# Patient Record
Sex: FEMALE | Race: WHITE | NOT HISPANIC OR LATINO | Employment: UNEMPLOYED | URBAN - METROPOLITAN AREA
[De-identification: names, ages, dates, MRNs, and addresses within clinical notes are randomized per-mention and may not be internally consistent; named-entity substitution may affect disease eponyms.]

---

## 2023-11-17 ENCOUNTER — OFFICE VISIT (OUTPATIENT)
Dept: DERMATOLOGY | Facility: CLINIC | Age: 48
End: 2023-11-17

## 2023-11-17 DIAGNOSIS — L98.8 WRINKLES: Primary | ICD-10-CM

## 2023-11-17 PROCEDURE — PBTXA ADMINISTRATION FEE COSMETIC: Performed by: DERMATOLOGY

## 2023-11-17 PROCEDURE — BOTOX BOTOX 1 UNIT: Performed by: DERMATOLOGY

## 2023-11-17 ASSESSMENT — DERMATOLOGY PATIENT ASSESSMENT
DO YOU USE SUNSCREEN: OCCASIONALLY
DO YOU HAVE IRREGULAR MENSTRUAL CYCLES: NO
DO YOU USE A TANNING BED: NO
ARE YOU ON BIRTH CONTROL: NO
HAVE YOU HAD OR DO YOU HAVE A STAPH INFECTION: NO
HAVE YOU HAD OR DO YOU HAVE VASCULAR DISEASE: NO
ARE YOU AN ORGAN TRANSPLANT RECIPIENT: NO
ARE YOU TRYING TO GET PREGNANT: NO
DO YOU HAVE ANY NEW OR CHANGING LESIONS: NO

## 2023-11-17 ASSESSMENT — DERMATOLOGY QUALITY OF LIFE (QOL) ASSESSMENT
RATE HOW BOTHERED YOU ARE BY EFFECTS OF YOUR SKIN PROBLEMS ON YOUR ACTIVITIES (EG, GOING OUT, ACCOMPLISHING WHAT YOU WANT, WORK ACTIVITIES OR YOUR RELATIONSHIPS WITH OTHERS): 0 - NEVER BOTHERED
WHAT SINGLE SKIN CONDITION LISTED BELOW IS THE PATIENT ANSWERING THE QUALITY-OF-LIFE ASSESSMENT QUESTIONS ABOUT: NONE OF THE ABOVE
RATE HOW EMOTIONALLY BOTHERED YOU ARE BY YOUR SKIN PROBLEM (FOR EXAMPLE, WORRY, EMBARRASSMENT, FRUSTRATION): 0 - NEVER BOTHERED
ARE THERE EXCLUSIONS OR EXCEPTIONS FOR THE QUALITY OF LIFE ASSESSMENT: NO
DATE THE QUALITY-OF-LIFE ASSESSMENT WAS COMPLETED: 66795
RATE HOW BOTHERED YOU ARE BY SYMPTOMS OF YOUR SKIN PROBLEM (EG, ITCHING, STINGING BURNING, HURTING OR SKIN IRRITATION): 0 - NEVER BOTHERED

## 2023-11-17 ASSESSMENT — ITCH NUMERIC RATING SCALE: HOW SEVERE IS YOUR ITCHING?: 0

## 2023-11-17 ASSESSMENT — PATIENT GLOBAL ASSESSMENT (PGA): PATIENT GLOBAL ASSESSMENT: PATIENT GLOBAL ASSESSMENT:  1 - CLEAR

## 2023-11-17 NOTE — PROGRESS NOTES
Subjective     Katiuska Dyer is a 48 y.o. female who presents for the following: Botulinum Toxin Injection (LV - 8/29/23 ).     Review of Systems:  No other skin or systemic complaints other than what is documented elsewhere in the note.    The following portions of the chart were reviewed this encounter and updated as appropriate:          Skin Cancer History  No skin cancer on file.      Specialty Problems    None       Objective   Well appearing patient in no apparent distress; mood and affect are within normal limits.    A focused skin examination was performed of the face. All findings within normal limits unless otherwise noted below.    Assessment/Plan   1. Wrinkles  Glabella  Dynamic and static rhytids    Botox is a neurotoxin which prevents muscles from jerzy that can help soften and smooth fine lines/wrinkles.  Risks and benefits were discussed bruising and swelling, lid droop, dropping of eyebrow, asymmetrical smile. For continued improvement ongoing treatment with Botox will soften lines  Botox typically begins to work 3 days after injection, full effect by 14 days. Typically lasts about 3 months. Continued treatments will continue to help lines to soften over time. However, lines may not completely disappear.  After treatment do not lie flat for 4 hours and do not exercise for 24 hours after the procedure.    Chemodenervation - Glabella    Date/Time: 11/17/2023 12:08 PM    Performed by: Ekta Taylor DO  Authorized by: Ekta Taylor DO      Cosmetic:  yes    Consent:      Consent obtained:  Written     Consent given by:  Patient     Risks discussed:  Poor cosmetic result     Alternatives discussed:  No treatment    Universal protocol:      Relevant documents present and verified:  Yes       Site/side verified:  Yes       Immediately prior to procedure a time out was called:  Yes       Patient identity confirmed:  Verbally with patient    Pre-procedure details:   Prep type:  Isopropyl  alcohol    Procedure details:      Diluted by:  Preservative free saline     Toxin (Brand):  OnaBoNT-A (Botox)     Concentration (u/mL):  2U/0.1mL    Total units injected:  34    Post-procedure details:      Patient tolerance of procedure:  Tolerated well, no immediate complications    Comments: Forehead - 4; Glabella - 12; Perioral -4; Periorbital - 13 (6.5 each side)    onabotulinumtoxinA (cosmetic) (Botox) injection 34 Units - Glabella      Related Procedures  Follow Up In Dermatology - Established Patient

## 2024-02-13 ENCOUNTER — OFFICE VISIT (OUTPATIENT)
Dept: DERMATOLOGY | Facility: CLINIC | Age: 49
End: 2024-02-13

## 2024-02-13 DIAGNOSIS — L98.8 WRINKLES: ICD-10-CM

## 2024-02-13 PROCEDURE — PBTXA ADMINISTRATION FEE COSMETIC: Performed by: DERMATOLOGY

## 2024-02-13 PROCEDURE — 1036F TOBACCO NON-USER: CPT | Performed by: DERMATOLOGY

## 2024-02-13 PROCEDURE — BOTOX BOTOX 1 UNIT: Performed by: DERMATOLOGY

## 2024-02-13 ASSESSMENT — DERMATOLOGY QUALITY OF LIFE (QOL) ASSESSMENT
RATE HOW EMOTIONALLY BOTHERED YOU ARE BY YOUR SKIN PROBLEM (FOR EXAMPLE, WORRY, EMBARRASSMENT, FRUSTRATION): 0 - NEVER BOTHERED
WHAT SINGLE SKIN CONDITION LISTED BELOW IS THE PATIENT ANSWERING THE QUALITY-OF-LIFE ASSESSMENT QUESTIONS ABOUT: NONE OF THE ABOVE
ARE THERE EXCLUSIONS OR EXCEPTIONS FOR THE QUALITY OF LIFE ASSESSMENT: NO
RATE HOW BOTHERED YOU ARE BY EFFECTS OF YOUR SKIN PROBLEMS ON YOUR ACTIVITIES (EG, GOING OUT, ACCOMPLISHING WHAT YOU WANT, WORK ACTIVITIES OR YOUR RELATIONSHIPS WITH OTHERS): 0 - NEVER BOTHERED
RATE HOW BOTHERED YOU ARE BY SYMPTOMS OF YOUR SKIN PROBLEM (EG, ITCHING, STINGING BURNING, HURTING OR SKIN IRRITATION): 0 - NEVER BOTHERED

## 2024-02-13 ASSESSMENT — DERMATOLOGY PATIENT ASSESSMENT
DO YOU HAVE ANY NEW OR CHANGING LESIONS: NO
DO YOU USE A TANNING BED: NO
ARE YOU AN ORGAN TRANSPLANT RECIPIENT: NO
DO YOU USE SUNSCREEN: DAILY
FOR PATIENTS COMING IN FOR A FOLLOW-UP VISIT - HAVE THERE BEEN ANY CHANGES IN YOUR HEALTH SINCE YOUR LAST VISIT: NO

## 2024-02-13 ASSESSMENT — PATIENT GLOBAL ASSESSMENT (PGA): PATIENT GLOBAL ASSESSMENT: PATIENT GLOBAL ASSESSMENT:  2 - MILD

## 2024-02-13 ASSESSMENT — ITCH NUMERIC RATING SCALE: HOW SEVERE IS YOUR ITCHING?: 0

## 2024-02-13 NOTE — PROGRESS NOTES
Subjective     Katiuska Dyer is a 48 y.o. female who presents for the following: Cosmetic (Pt here for Botox. LV-11/2023 34 units total injected=Forehead-4, Glabella-12, Perioral-4, Periorbital-13(6.5 each side). Pt thinks she may need more in forehead area. Not have perioral area injected. ).     Review of Systems:  No other skin or systemic complaints other than what is documented elsewhere in the note.    The following portions of the chart were reviewed this encounter and updated as appropriate:          Skin Cancer History  No skin cancer on file.      Specialty Problems    None       Objective   Well appearing patient in no apparent distress; mood and affect are within normal limits.    A focused skin examination was performed of the face. All findings within normal limits unless otherwise noted below.    Assessment/Plan   1. Wrinkles  Glabella  Dynamic and static rhytids    Botox is a neurotoxin which prevents muscles from jerzy that can help soften and smooth fine lines/wrinkles.  Risks and benefits were discussed bruising and swelling, lid droop, dropping of eyebrow, asymmetrical smile. For continued improvement ongoing treatment with Botox will soften lines  Botox typically begins to work 3 days after injection, full effect by 14 days. Typically lasts about 3 months. Continued treatments will continue to help lines to soften over time. However, lines may not completely disappear.  After treatment do not lie flat for 4 hours and do not exercise for 24 hours after the procedure.    Chemodenervation - Glabella    Date/Time: 2/13/2024 11:59 AM    Performed by: Ekta Taylor DO  Authorized by: Ekta Taylor DO      Cosmetic:  yes    Consent:      Consent obtained:  Written     Consent given by:  Patient     Risks discussed:  Poor cosmetic result     Alternatives discussed:  No treatment    Universal protocol:      Relevant documents present and verified:  Yes       Site/side verified:  Yes        Immediately prior to procedure a time out was called:  Yes      Pre-procedure details:   Prep type:  Isopropyl alcohol    Procedure details:      Diluted by:  Preservative free saline     Toxin (Brand):  OnaBoNT-A (Botox)     Concentration (u/mL):  2U/0.1mL    Total units injected:  30    Post-procedure details:      Patient tolerance of procedure:  Tolerated well, no immediate complications    Comments: Forehead - 5  Periorbital - 13 (6.5 per side)  Glabella - 12    botulinum toxin Type A (Cosm) (Botox) injection 30 Units - Glabella      Related Procedures  Follow Up In Dermatology - Established Patient

## 2024-05-07 ENCOUNTER — OFFICE VISIT (OUTPATIENT)
Dept: DERMATOLOGY | Facility: CLINIC | Age: 49
End: 2024-05-07

## 2024-05-07 ENCOUNTER — OFFICE VISIT (OUTPATIENT)
Dept: DERMATOLOGY | Facility: CLINIC | Age: 49
End: 2024-05-07
Payer: COMMERCIAL

## 2024-05-07 DIAGNOSIS — L57.8 PHOTOAGING OF SKIN: ICD-10-CM

## 2024-05-07 DIAGNOSIS — D22.71 MELANOCYTIC NEVI OF RIGHT LOWER LIMB, INCLUDING HIP: ICD-10-CM

## 2024-05-07 DIAGNOSIS — L81.4 LENTIGO: ICD-10-CM

## 2024-05-07 DIAGNOSIS — Z41.9 SURGERY, ELECTIVE: ICD-10-CM

## 2024-05-07 DIAGNOSIS — D22.5 MELANOCYTIC NEVI OF TRUNK: ICD-10-CM

## 2024-05-07 DIAGNOSIS — D22.72 MELANOCYTIC NEVI OF LEFT LOWER EXTREMITY OR HIP: ICD-10-CM

## 2024-05-07 DIAGNOSIS — D18.01 HEMANGIOMA OF SKIN: ICD-10-CM

## 2024-05-07 DIAGNOSIS — Z12.83 ENCOUNTER FOR SCREENING FOR MALIGNANT NEOPLASM OF SKIN: ICD-10-CM

## 2024-05-07 DIAGNOSIS — L98.8 WRINKLES: Primary | ICD-10-CM

## 2024-05-07 DIAGNOSIS — D22.60 MELANOCYTIC NEVI OF UNSPECIFIED UPPER LIMB, INCLUDING SHOULDER: ICD-10-CM

## 2024-05-07 DIAGNOSIS — Z85.828 PERSONAL HISTORY OF OTHER MALIGNANT NEOPLASM OF SKIN: Primary | ICD-10-CM

## 2024-05-07 PROCEDURE — BOTOX BOTOX 1 UNIT: Performed by: DERMATOLOGY

## 2024-05-07 PROCEDURE — PBTXA ADMINISTRATION FEE COSMETIC: Performed by: DERMATOLOGY

## 2024-05-07 PROCEDURE — 1036F TOBACCO NON-USER: CPT | Performed by: DERMATOLOGY

## 2024-05-07 PROCEDURE — 99213 OFFICE O/P EST LOW 20 MIN: CPT | Performed by: DERMATOLOGY

## 2024-05-07 ASSESSMENT — DERMATOLOGY QUALITY OF LIFE (QOL) ASSESSMENT
ARE THERE EXCLUSIONS OR EXCEPTIONS FOR THE QUALITY OF LIFE ASSESSMENT: NO
RATE HOW EMOTIONALLY BOTHERED YOU ARE BY YOUR SKIN PROBLEM (FOR EXAMPLE, WORRY, EMBARRASSMENT, FRUSTRATION): 0 - NEVER BOTHERED
WHAT SINGLE SKIN CONDITION LISTED BELOW IS THE PATIENT ANSWERING THE QUALITY-OF-LIFE ASSESSMENT QUESTIONS ABOUT: NONE OF THE ABOVE
RATE HOW BOTHERED YOU ARE BY EFFECTS OF YOUR SKIN PROBLEMS ON YOUR ACTIVITIES (EG, GOING OUT, ACCOMPLISHING WHAT YOU WANT, WORK ACTIVITIES OR YOUR RELATIONSHIPS WITH OTHERS): 0 - NEVER BOTHERED
RATE HOW EMOTIONALLY BOTHERED YOU ARE BY YOUR SKIN PROBLEM (FOR EXAMPLE, WORRY, EMBARRASSMENT, FRUSTRATION): 0 - NEVER BOTHERED
RATE HOW BOTHERED YOU ARE BY SYMPTOMS OF YOUR SKIN PROBLEM (EG, ITCHING, STINGING BURNING, HURTING OR SKIN IRRITATION): 0 - NEVER BOTHERED
ARE THERE EXCLUSIONS OR EXCEPTIONS FOR THE QUALITY OF LIFE ASSESSMENT: NO
RATE HOW BOTHERED YOU ARE BY SYMPTOMS OF YOUR SKIN PROBLEM (EG, ITCHING, STINGING BURNING, HURTING OR SKIN IRRITATION): 0 - NEVER BOTHERED
RATE HOW BOTHERED YOU ARE BY EFFECTS OF YOUR SKIN PROBLEMS ON YOUR ACTIVITIES (EG, GOING OUT, ACCOMPLISHING WHAT YOU WANT, WORK ACTIVITIES OR YOUR RELATIONSHIPS WITH OTHERS): 0 - NEVER BOTHERED
WHAT SINGLE SKIN CONDITION LISTED BELOW IS THE PATIENT ANSWERING THE QUALITY-OF-LIFE ASSESSMENT QUESTIONS ABOUT: NONE OF THE ABOVE

## 2024-05-07 ASSESSMENT — DERMATOLOGY PATIENT ASSESSMENT
ARE YOU AN ORGAN TRANSPLANT RECIPIENT: NO
DO YOU USE SUNSCREEN: DAILY
ARE YOU AN ORGAN TRANSPLANT RECIPIENT: NO
FOR PATIENTS COMING IN FOR A FOLLOW-UP VISIT - HAVE THERE BEEN ANY CHANGES IN YOUR HEALTH SINCE YOUR LAST VISIT: NO
DO YOU USE SUNSCREEN: DAILY
DO YOU HAVE ANY NEW OR CHANGING LESIONS: NO
WHERE ARE THESE NEW OR CHANGING LESIONS LOCATED: LEGS
DO YOU USE A TANNING BED: NO
DO YOU HAVE ANY NEW OR CHANGING LESIONS: NO
ARE YOU TRYING TO GET PREGNANT: NO
FOR PATIENTS COMING IN FOR A FOLLOW-UP VISIT - HAVE THERE BEEN ANY CHANGES IN YOUR HEALTH SINCE YOUR LAST VISIT: NO
DO YOU USE A TANNING BED: NO

## 2024-05-07 ASSESSMENT — ITCH NUMERIC RATING SCALE
HOW SEVERE IS YOUR ITCHING?: 0
HOW SEVERE IS YOUR ITCHING?: 0

## 2024-05-07 ASSESSMENT — PATIENT GLOBAL ASSESSMENT (PGA)
PATIENT GLOBAL ASSESSMENT: PATIENT GLOBAL ASSESSMENT:  2 - MILD
PATIENT GLOBAL ASSESSMENT: PATIENT GLOBAL ASSESSMENT:  1 - CLEAR

## 2024-05-07 NOTE — PROGRESS NOTES
Subjective     Katiuska Dyer is a 48 y.o. female who presents for the following: Cosmetic (Pt here for Botox. LV 30 units total injected(glabella-12, forehead-5, periorbital-6.5 per side). Pt was happy with results. Would like to not inject forehead this time.).     Review of Systems:  No other skin or systemic complaints other than what is documented elsewhere in the note.    The following portions of the chart were reviewed this encounter and updated as appropriate:          Skin Cancer History  No skin cancer on file.      Specialty Problems    None       Objective   Well appearing patient in no apparent distress; mood and affect are within normal limits.    A focused skin examination was performed. All findings within normal limits unless otherwise noted below.    Assessment/Plan   1. Wrinkles  Glabella  Dynamic and static rhytids    Botox is a neurotoxin which prevents muscles from jerzy that can help soften and smooth fine lines/wrinkles.  Risks and benefits were discussed bruising and swelling, lid droop, dropping of eyebrow, asymmetrical smile. For continued improvement ongoing treatment with Botox will soften lines  Botox typically begins to work 3 days after injection, full effect by 14 days. Typically lasts about 3 months. Continued treatments will continue to help lines to soften over time. However, lines may not completely disappear.  After treatment do not lie flat for 4 hours and do not exercise for 24 hours after the procedure.    Chemodenervation - Glabella    Date/Time: 5/7/2024 8:50 AM    Performed by: Ekta Taylor DO  Authorized by: Ekta Taylor DO      Cosmetic:  yesnot medical botulinum toxin injection    Consent:      Consent obtained:  Written     Consent given by:  Patient     Risks discussed:  Poor cosmetic result     Alternatives discussed:  No treatment    Universal protocol:      Relevant documents present and verified:  Yes       Site/side verified:  Yes       Patient  identity confirmed:  Verbally with patient    Pre-procedure details:   Prep type:  Isopropyl alcohol    Procedure details:      Diluted by:  Preservative free saline     Toxin (Brand):  OnaBoNT-A (Botox)     Concentration (u/mL):  2U/01mL      Post-procedure details:      Patient tolerance of procedure:  Tolerated well, no immediate complications    Comments: Units injected: 27 units  15 glabella  12 periorbital      botulinum toxin Type A (Cosm) (Botox) injection 27 Units - Glabella      We discussed:  Fraxel - Fall - chest for browns x 1-2 treatments  Volbella - eyes and fine lines in periorbital  0.5cc syringe for all - aware to call prior as we will have to order product.

## 2024-05-07 NOTE — PROGRESS NOTES
Subjective     Katiuska Dyer is a 48 y.o. female who presents for the following: Skin Check (Pt here for yearly FBSE. Hx of AMH. Pt has lesions on legs she wants to be sure are okay.).     Review of Systems:  No other skin or systemic complaints other than what is documented elsewhere in the note.    The following portions of the chart were reviewed this encounter and updated as appropriate:         Skin Cancer History  No skin cancer on file.      Specialty Problems    None       Objective   Well appearing patient in no apparent distress; mood and affect are within normal limits.    A full examination was performed including scalp, head, eyes, ears, nose, lips, neck, chest, axillae, abdomen, back, buttocks, bilateral upper extremities, bilateral lower extremities, hands, feet, fingers, toes, fingernails, and toenails. All findings within normal limits unless otherwise noted below.    Assessment/Plan   1. Personal history of other malignant neoplasm of skin  Right Abdomen (side) - Upper  Well healed scar at site of prior treatment without evidence of recurrence.    There is no evidence of recurrence on clinical examination today, reassurance was provided to the patient. The importance of sun protection was reviewed with the patient including the use of a broad spectrum sunscreen that protects against both UVA/UVB rays, with ingredients such as Zinc oxide or titanium dioxide, wearing sun protective clothing and sun avoidance. Warning signs of non-melanoma skin cancer were reviewed. ABCDEs of melanoma reviewed. Patient to f/u should they notice any new or changing pre-existing skin lesion    2. Encounter for screening for malignant neoplasm of skin  No suspicious lesions noted on examination today    The risk of chronic, cumulative sun damage and risk of development of skin cancer was reviewed today.   The importance of sun protection was reviewed: including the use of a broad spectrum sunscreen that protects against  both UVA/UVB rays, with ingredients such as Zinc oxide or titanium dioxide, wearing sun protective clothing and sun avoidance. We reviewed the warning signs of non-melanoma skin cancer and ABCDEs of melanoma  Please follow up should you notice any new or changing pre-existing skin lesion.    3. Photoaging of skin  Mottled pigmentation with telangiectasias and brown reticular macules in sun exposed areas of the body.    The risk of chronic, cumulative sun damage and risk of development of skin cancer was reviewed today.   The importance of sun protection was reviewed: including the use of a broad spectrum sunscreen that protects against both UVA/UVB rays, with ingredients such as Zinc oxide or titanium dioxide, wearing sun protective clothing and sun avoidance. We reviewed the warning signs of non-melanoma skin cancer and ABCDEs of melanoma  Please follow up should you notice any new or changing pre-existing skin lesion.    4. Melanocytic nevi of unspecified upper limb, including shoulder (2)  Left Arm, Right Arm  Scattered, uniform and benign-appearing, regular brown melanocytic papules and macules.    Clinically benign appearing nevi, no treatment is necessary.  The importance of sun protection was reviewed: including the use of a broad spectrum sunscreen that protects against both UVA/UVB rays, with ingredients such as Zinc oxide or titanium dioxide, wearing sun protective clothing and sun avoidance.   ABCDEs of melanoma reviewed.  Please follow up should you notice any new or changing pre-existing skin lesion.    5. Melanocytic nevi of left lower extremity or hip  Left Leg  Scattered, uniform and benign-appearing, regular brown melanocytic papules and macules.    Clinically benign appearing nevi, no treatment is necessary.  The importance of sun protection was reviewed: including the use of a broad spectrum sunscreen that protects against both UVA/UVB rays, with ingredients such as Zinc oxide or titanium dioxide,  wearing sun protective clothing and sun avoidance.   ABCDEs of melanoma reviewed.  Please follow up should you notice any new or changing pre-existing skin lesion.    6. Melanocytic nevi of right lower limb, including hip  Right Leg  Scattered, uniform and benign-appearing, regular brown melanocytic papules and macules.    Clinically benign appearing nevi, no treatment is necessary.  The importance of sun protection was reviewed: including the use of a broad spectrum sunscreen that protects against both UVA/UVB rays, with ingredients such as Zinc oxide or titanium dioxide, wearing sun protective clothing and sun avoidance.   ABCDEs of melanoma reviewed.  Please follow up should you notice any new or changing pre-existing skin lesion.    7. Melanocytic nevi of trunk (3)  Abdomen (Lower Torso, Anterior), Chest (Upper Torso, Anterior), Torso - Posterior (Back)  Tan-brown symmetric macules and papules    Clinically benign appearing nevi, no treatment is necessary.  The importance of sun protection was reviewed: including the use of a broad spectrum sunscreen that protects against both UVA/UVB rays, with ingredients such as Zinc oxide or titanium dioxide, wearing sun protective clothing and sun avoidance.   ABCDEs of melanoma reviewed.  Please follow up should you notice any new or changing pre-existing skin lesion.    8. Lentigo  Scattered tan macules in sun-exposed areas.    These are benign skin lesions due to sun exposure. They will darken in response to sun exposure. They should be monitored for change in size, shape or color.  These lesions can be treated cosmetically with topical creams, liquid nitrogen and a variety of lasers.    9. Hemangioma of skin  Cherry red papules    The benign nature of these skin lesions were reviewed, no treatment is necessary.   Please follow up for any new or pre-existing lesion that is changing in size, shape, color, becomes painful, tender, itches or bleed.

## 2024-07-23 ENCOUNTER — APPOINTMENT (OUTPATIENT)
Dept: DERMATOLOGY | Facility: CLINIC | Age: 49
End: 2024-07-23
Payer: COMMERCIAL

## 2024-07-23 DIAGNOSIS — Z41.9 ELECTIVE SURGERY FOR PURPOSES OTHER THAN TREATING HEALTH CONDITIONS: ICD-10-CM

## 2024-07-23 DIAGNOSIS — L98.8 WRINKLES: Primary | ICD-10-CM

## 2024-07-23 PROCEDURE — BOTOX BOTOX 1 UNIT: Performed by: DERMATOLOGY

## 2024-07-23 PROCEDURE — DVOBE: Performed by: DERMATOLOGY

## 2024-07-23 PROCEDURE — 1036F TOBACCO NON-USER: CPT | Performed by: DERMATOLOGY

## 2024-07-23 PROCEDURE — PBTXA ADMINISTRATION FEE COSMETIC: Performed by: DERMATOLOGY

## 2024-07-23 ASSESSMENT — ITCH NUMERIC RATING SCALE: HOW SEVERE IS YOUR ITCHING?: 0

## 2024-07-23 ASSESSMENT — DERMATOLOGY PATIENT ASSESSMENT
DO YOU HAVE ANY NEW OR CHANGING LESIONS: NO
DO YOU USE SUNSCREEN: DAILY
ARE YOU AN ORGAN TRANSPLANT RECIPIENT: NO
DO YOU USE A TANNING BED: NO
FOR PATIENTS COMING IN FOR A FOLLOW-UP VISIT - HAVE THERE BEEN ANY CHANGES IN YOUR HEALTH SINCE YOUR LAST VISIT: NO
ARE YOU TRYING TO GET PREGNANT: NO
ARE YOU ON BIRTH CONTROL: NO

## 2024-07-23 ASSESSMENT — DERMATOLOGY QUALITY OF LIFE (QOL) ASSESSMENT
RATE HOW BOTHERED YOU ARE BY SYMPTOMS OF YOUR SKIN PROBLEM (EG, ITCHING, STINGING BURNING, HURTING OR SKIN IRRITATION): 0 - NEVER BOTHERED
WHAT SINGLE SKIN CONDITION LISTED BELOW IS THE PATIENT ANSWERING THE QUALITY-OF-LIFE ASSESSMENT QUESTIONS ABOUT: NONE OF THE ABOVE
ARE THERE EXCLUSIONS OR EXCEPTIONS FOR THE QUALITY OF LIFE ASSESSMENT: NO
RATE HOW EMOTIONALLY BOTHERED YOU ARE BY YOUR SKIN PROBLEM (FOR EXAMPLE, WORRY, EMBARRASSMENT, FRUSTRATION): 0 - NEVER BOTHERED
RATE HOW BOTHERED YOU ARE BY EFFECTS OF YOUR SKIN PROBLEMS ON YOUR ACTIVITIES (EG, GOING OUT, ACCOMPLISHING WHAT YOU WANT, WORK ACTIVITIES OR YOUR RELATIONSHIPS WITH OTHERS): 0 - NEVER BOTHERED

## 2024-07-23 ASSESSMENT — PATIENT GLOBAL ASSESSMENT (PGA): PATIENT GLOBAL ASSESSMENT: PATIENT GLOBAL ASSESSMENT:  1 - CLEAR

## 2024-07-23 NOTE — PROGRESS NOTES
Subjective     Katiuska Dyer is a 48 y.o. female who presents for the following: Cosmetic (Pt here today for Botox. LV 5/7/24- 27 units total(15 glabella, 12 periorbital). Pt here for Filler. LV discussed Volbella- eyes and fine lines in periorbital 0.5cc syringe for all./).     Review of Systems:  No other skin or systemic complaints other than what is documented elsewhere in the note.    The following portions of the chart were reviewed this encounter and updated as appropriate:          Skin Cancer History  No skin cancer on file.      Specialty Problems    None       Objective   Well appearing patient in no apparent distress; mood and affect are within normal limits.    A focused skin examination was performed of the face. All findings within normal limits unless otherwise noted below.    Assessment/Plan   1. Wrinkles (2)  Left Temple, Right Zygomatic Area  Dynamic and static rhytids    Botox is a neurotoxin which prevents muscles from jerzy that can help soften and smooth fine lines/wrinkles.  Risks and benefits were discussed bruising and swelling, lid droop, dropping of eyebrow, asymmetrical smile. For continued improvement ongoing treatment with Botox will soften lines  Botox typically begins to work 3 days after injection, full effect by 14 days. Typically lasts about 3 months. Continued treatments will continue to help lines to soften over time. However, lines may not completely disappear.  After treatment do not lie flat for 4 hours and do not exercise for 24 hours after the procedure.    Chemodenervation - Left Temple, Right Zygomatic Area    Date/Time: 7/23/2024 1:39 PM    Performed by: Ekta Taylor DO  Authorized by: Ekta Taylor DO      Cosmetic:  yesnot medical botulinum toxin injection    Consent:      Consent obtained:  Written     Consent given by:  Patient     Risks discussed:  Poor cosmetic result     Alternatives discussed:  No treatment    Universal protocol:      Relevant  documents present and verified:  Yes       Site/side verified:  Yes       Patient identity confirmed:  Verbally with patient    Pre-procedure details:   Prep type:  Isopropyl alcohol    Procedure details:      Diluted by:  Preservative free saline     Toxin (Brand):  OnaBoNT-A (Botox)     Concentration (u/mL):  2U/0.1mL      Post-procedure details:      Patient tolerance of procedure:  Tolerated well, no immediate complications    Comments: Units injected:  Periorbital - 14 (7/side)    botulinum toxin Type A (Cosm) (Botox) injection 14 Units - Left Temple, Right Zygomatic Area        Cosmetic Filler Procedure Note  Diagnosis: rhytides, scar on nasal tip  Location: see diagram and fine lines on bilateral cheeks, scar on nasal tip  Filler:  Juvederm volbella - 0.55cc  Informed consent: Discussed risks (infection, pain, bleeding, bruising, swelling, allergic reaction, lumpiness of skin, waviness of skin, discoloration of skin, undesirable cosmetic result, need for additional treatment, herpes outbreak, scarring, blindness, and death) and benefits of the procedure, as well as the alternatives.  Informed consent was obtained.  Preparation: The area was cleansed with alcohol.  Anesthesia:  none  Procedure Details:  Filler was injected into the appropriate areas using a standard technique.  Pressure was applied for hemostasis.  Ice was offered for swelling.  Lot Number:  [id number]  Expiration:  8/2/24  Total Volume Injected:  0.5  Plan:  She was instructed to use Tylenol for any pain.  Return to clinic for any nodules or lumpiness that appear in the first 48 hours.  She will call for any problems.  Return to clinic for additional dosing as needed.    Tolerated well by patient.      Plan for Fraxel in the Fall for browns.    Follow up in 3-4 months for botox.

## 2024-08-26 ENCOUNTER — TELEPHONE (OUTPATIENT)
Dept: DERMATOLOGY | Facility: CLINIC | Age: 49
End: 2024-08-26
Payer: COMMERCIAL

## 2024-08-26 NOTE — TELEPHONE ENCOUNTER
Pt LM wanting to schedule an appt in Oct for Botox. Also stated she is still receiving a bill for $583 for previous Volbella treatment.   Returned call and scheduled pts Botox appt 10/15/24 10:20a. Informed pt I would reach back out to management and billing r/t bill. Instructed pt to NOT pay balance as she paid in full at time of service.

## 2024-10-15 ENCOUNTER — APPOINTMENT (OUTPATIENT)
Dept: DERMATOLOGY | Facility: CLINIC | Age: 49
End: 2024-10-15
Payer: COMMERCIAL

## 2024-10-15 DIAGNOSIS — Z41.9 ELECTIVE SURGERY FOR PURPOSES OTHER THAN TREATING HEALTH CONDITIONS: Primary | ICD-10-CM

## 2024-10-15 PROCEDURE — BOTOX BOTOX 1 UNIT: Performed by: DERMATOLOGY

## 2024-10-15 PROCEDURE — PBTXA ADMINISTRATION FEE COSMETIC: Performed by: DERMATOLOGY

## 2024-10-15 NOTE — PROGRESS NOTES
Subjective     Katiuska Dyer is a 49 y.o. female who presents for the following: Botulinum Toxin Injection (Patient would like botox injections around eyes and a little on forehead.).     Review of Systems:  No other skin or systemic complaints other than what is documented elsewhere in the note.    The following portions of the chart were reviewed this encounter and updated as appropriate:          Skin Cancer History  No skin cancer on file.      Specialty Problems    None       Objective   Well appearing patient in no apparent distress; mood and affect are within normal limits.    A focused skin examination was performed of the face. All findings within normal limits unless otherwise noted below.    Assessment/Plan   1. Elective surgery for purposes other than treating health conditions (4)  Left Forehead, Left Temple, Right Forehead, Right Zygomatic Area  Dynamic and static rhytids    Botox is a neurotoxin which prevents muscles from jerzy that can help soften and smooth fine lines/wrinkles.  Risks and benefits were discussed bruising and swelling, lid droop, dropping of eyebrow, asymmetrical smile. For continued improvement ongoing treatment with Botox will soften lines  Botox typically begins to work 3 days after injection, full effect by 14 days. Typically lasts about 3 months. Continued treatments will continue to help lines to soften over time. However, lines may not completely disappear.  After treatment do not lie flat for 4 hours and do not exercise for 24 hours after the procedure.    Chemodenervation - Left Forehead, Left Temple, Right Forehead, Right Zygomatic Area    Date/Time: 10/15/2024 10:41 AM    Performed by: Ekta Taylor DO  Authorized by: Ekta Taylor DO      Cosmetic:  yesnot medical botulinum toxin injection    Consent:      Consent obtained:  Written     Consent given by:  Patient     Risks discussed:  Poor cosmetic result     Alternatives discussed:  No treatment    Universal  protocol:      Relevant documents present and verified:  Yes       Site/side verified:  Yes       Patient identity confirmed:  Verbally with patient    Pre-procedure details:   Prep type:  Isopropyl alcohol    Procedure details:      Diluted by:  Preservative free saline     Toxin (Brand):  OnaBoNT-A (Botox)     Concentration (u/mL):  2U/0.1mL    Total units injected:  19    Post-procedure details:      Patient tolerance of procedure:  Tolerated well, no immediate complications    Comments: Units injected: 19  Periorbital - 14 - 7 each side  Forehead - 5      onabotulinumtoxinA (Botox Cosmetic) injection 19 Units - Left Forehead, Left Temple, Right Forehead, Right Zygomatic Area        We discussed Fraxel for face and the chest - she will defer    Fraxel laser is a laser used for treatment of fine to moderate lines from aging, brown spots and some types of scarring.  Typically more than 1 treatment is needed for treatment, depending on the goal of treatment. For brown spots 1 treat may be adequate in some circumstances. For fine lines and wrinkles typically 3-4 treatments are needed.    If there is a known history of cold sores medication can be prescribed to prevent this from occurring as the procedure may cause cold sores to occur.  Topical retinoids should be avoided 1 week before and 1 week after the procedure.    Following treatment the face will be red, it will peel and will swell. The downtime of the procedure is approximately 7-10 days. It may be closer to 14 days for some patients.

## 2024-10-31 ENCOUNTER — TELEPHONE (OUTPATIENT)
Dept: DERMATOLOGY | Facility: CLINIC | Age: 49
End: 2024-10-31
Payer: COMMERCIAL

## 2024-10-31 DIAGNOSIS — Z41.9 ELECTIVE SURGERY FOR PURPOSES OTHER THAN TREATING HEALTH CONDITIONS: Primary | ICD-10-CM

## 2024-10-31 RX ORDER — LIDOCAINE/TETRACAINE/BENZOCAIN 10-10-20 %
1 OINTMENT (GRAM) TOPICAL SEE ADMIN INSTRUCTIONS
Qty: 30 G | Refills: 1 | Status: SHIPPED | OUTPATIENT
Start: 2024-10-31

## 2024-12-05 ENCOUNTER — APPOINTMENT (OUTPATIENT)
Dept: DERMATOLOGY | Facility: CLINIC | Age: 49
End: 2024-12-05
Payer: COMMERCIAL

## 2024-12-05 DIAGNOSIS — Z41.9 ELECTIVE SURGERY FOR PURPOSES OTHER THAN TREATING HEALTH CONDITIONS: Primary | ICD-10-CM

## 2024-12-05 PROCEDURE — DFRX1: Performed by: DERMATOLOGY

## 2024-12-05 RX ORDER — LUTEIN 6 MG
TABLET ORAL
COMMUNITY

## 2024-12-05 RX ORDER — IBUPROFEN 100 MG/5ML
SUSPENSION, ORAL (FINAL DOSE FORM) ORAL
COMMUNITY

## 2024-12-05 RX ORDER — NORETHINDRONE ACETATE, ETHINYL ESTRADIOL AND FERROUS FUMARATE 1MG-20(24)
1 KIT ORAL DAILY
COMMUNITY

## 2024-12-05 RX ORDER — VIT C/E/ZN/COPPR/LUTEIN/ZEAXAN 250MG-90MG
CAPSULE ORAL
COMMUNITY

## 2024-12-05 NOTE — PROGRESS NOTES
Subjective     Katiuska Dyer is a 49 y.o. female who presents for the following: Cosmetic (Fraxel of chest). She has not had previous treatments. She has topical anesthetic that was prescribed    Review of Systems:  No other skin or systemic complaints other than what is documented elsewhere in the note.    The following portions of the chart were reviewed this encounter and updated as appropriate:          Skin Cancer History  No skin cancer on file.      Specialty Problems    None       Objective   Well appearing patient in no apparent distress; mood and affect are within normal limits.    A focused skin examination was performed. All findings within normal limits unless otherwise noted below.    Assessment/Plan   1. Elective surgery for purposes other than treating health conditions  Chest (Upper Torso, Anterior)  Brown reticular macules and mottled pigmentation on the chest          Fraxel laser is a laser used for treatment of fine to moderate lines from aging, brown spots and some types of scarring.  Typically more than 1 treatment is needed for treatment, depending on the goal of treatment. For brown spots 1 treat may be adequate in some circumstances. For fine lines and wrinkles typically 3-4 treatments are needed.    If there is a known history of cold sores medication can be prescribed to prevent this from occurring as the procedure may cause cold sores to occur.  Topical retinoids should be avoided 1 week before and 1 week after the procedure.    Following treatment the face will be red, it will peel and will swell. The downtime of the procedure is approximately 7-10 days. It may be closer to 14 days for some patients.    Fraxel Laser Procedure Note  Patient questions:   Patient using isotretinoin - No  Patient using retinoids - No  Patient has history of cold sores - No  Consents obtained - Yes - Written  Photos obtained -Yes  Indication: photodamage, lentigenes  Anesthetic usage:   Topical anesthetic  10/10/10 Benzocaine/Lidocaine/Tetracaine 20/10/10% applied in office for  50 minutes prior to procedure  Treatment Details  Fraxel Type: Re-store  Cooling: InPronto cooler  Wavelength: 5037nm  Location:  Chest  Energy (mJ): 10  Treatment Level:3  Passes: 8  Comments: erythema noted, 0.8kj      Related Medications  lidocaine-tetracaine-benzocain (Enznonuty) 10-10-20 % ointment  Apply 1 Application topically see administration instructions. Physician to apply in office 1 hour prior to procedure

## 2025-01-07 ENCOUNTER — APPOINTMENT (OUTPATIENT)
Dept: DERMATOLOGY | Facility: CLINIC | Age: 50
End: 2025-01-07
Payer: COMMERCIAL

## 2025-01-07 DIAGNOSIS — Z41.9 ELECTIVE SURGERY FOR PURPOSES OTHER THAN TREATING HEALTH CONDITIONS: Primary | ICD-10-CM

## 2025-01-07 PROCEDURE — PBTXA ADMINISTRATION FEE COSMETIC: Performed by: DERMATOLOGY

## 2025-01-07 PROCEDURE — BOTOX BOTOX 1 UNIT: Performed by: DERMATOLOGY

## 2025-01-07 RX ORDER — TRETINOIN 0.25 MG/G
CREAM TOPICAL NIGHTLY
Qty: 45 G | Refills: 3 | Status: SHIPPED | OUTPATIENT
Start: 2025-01-07 | End: 2026-01-07

## 2025-01-07 NOTE — PROGRESS NOTES
Subjective     Katiuska Dyer is a 49 y.o. female who presents for the following: Cosmetic (Botox injection. Changes number of units each time. Would like provider to evaluate to know how much she needs. Just had Fraxel for chest and reports improvement, but thinks she needs another procedure.).     Review of Systems:  No other skin or systemic complaints other than what is documented elsewhere in the note.    The following portions of the chart were reviewed this encounter and updated as appropriate:          Skin Cancer History  No skin cancer on file.      Specialty Problems    None       Objective   Well appearing patient in no apparent distress; mood and affect are within normal limits.    A focused skin examination was performed. All findings within normal limits unless otherwise noted below.    Assessment/Plan   1. Elective surgery for purposes other than treating health conditions (4)  Left Forehead, Left Temple, Right Forehead, Right Zygomatic Area  Dynamic and static rhytids    Botox is a neurotoxin which prevents muscles from jerzy that can help soften and smooth fine lines/wrinkles.  Risks and benefits were discussed bruising and swelling, lid droop, dropping of eyebrow, asymmetrical smile. For continued improvement ongoing treatment with Botox will soften lines  Botox typically begins to work 3 days after injection, full effect by 14 days. Typically lasts about 3 months. Continued treatments will continue to help lines to soften over time. However, lines may not completely disappear.  After treatment do not lie flat for 4 hours and do not exercise for 24 hours after the procedure.    Chemodenervation - Left Forehead, Left Temple, Right Forehead, Right Zygomatic Area    Date/Time: 1/7/2025 11:40 AM    Performed by: Ekta Taylor DO  Authorized by: Ekta Taylor DO      Cosmetic:  yesnot medical botulinum toxin injection    Consent:      Consent obtained:  Written     Consent given by:   Patient     Risks discussed:  Poor cosmetic result     Alternatives discussed:  No treatment    Universal protocol:      Relevant documents present and verified:  Yes       Site/side verified:  Yes       Patient identity confirmed:  Verbally with patient    Pre-procedure details:   Prep type:  Isopropyl alcohol    Procedure details:      Diluted by:  Preservative free saline     Toxin (Brand):  OnaBoNT-A (Botox)     Concentration (u/mL):  2U/0.1mL    Total units injected:  29    Post-procedure details:      Patient tolerance of procedure:  Tolerated well, no immediate complications    Comments: Units injected:29 U  Glabella - 10  Periorbital - 14 - 7 each side  Forehead - 5  Lot: [id number]  Exp: 9/30/2026    Related Procedures  Staff Communication: Dermatology Local Anesthesia: Other - Drug:  botox    Strength:  2U/0.1mL     Amount: 29U    Related Medications  lidocaine-tetracaine-benzocain (Enznonuty) 10-10-20 % ointment  Apply 1 Application topically see administration instructions. Physician to apply in office 1 hour prior to procedure    tretinoin (Retin-A) 0.025 % cream  Apply topically once daily at bedtime. A pea-sized amount to the whole face, start every 2-3 nights and gradually increase to nightly      Discussed Fraxel x1 more for chest and 1 for face in future.    Start tretinoin 0.025% cream Side effects of topical retinoids reviewed including increased photosensitivity, dryness, irritation and redness. To help alleviate side effects patient advised to apply initially every 2-3 nights and increase to daily as tolerated or apply topical non-comedogenic moisturizer prior to application.

## 2025-02-27 ENCOUNTER — APPOINTMENT (OUTPATIENT)
Dept: DERMATOLOGY | Facility: CLINIC | Age: 50
End: 2025-02-27
Payer: COMMERCIAL

## 2025-02-27 DIAGNOSIS — Z41.9 ELECTIVE SURGERY FOR PURPOSES OTHER THAN TREATING HEALTH CONDITIONS: Primary | ICD-10-CM

## 2025-02-27 PROCEDURE — PBTXA ADMINISTRATION FEE COSMETIC: Performed by: DERMATOLOGY

## 2025-02-27 PROCEDURE — BOTOX BOTOX 1 UNIT: Performed by: DERMATOLOGY

## 2025-02-27 PROCEDURE — DFRX1: Performed by: DERMATOLOGY

## 2025-02-27 NOTE — PROGRESS NOTES
Subjective     Katiuska Dyer is a 49 y.o. female who presents for the following: Cosmetic (Pt here for 2nd Fraxel treatment to neck/chest area. Pt arrived 1 hr early for topical numbing application. /Pt also here for cosmetic treatment of wrinkles with Botox. LV- 29 units injected= Glabella -10, Periorbital -14(7 each side), Forehead -5)./).     Review of Systems:  No other skin or systemic complaints other than what is documented elsewhere in the note.    The following portions of the chart were reviewed this encounter and updated as appropriate:          Skin Cancer History  No skin cancer on file.      Specialty Problems    None       Objective   Well appearing patient in no apparent distress; mood and affect are within normal limits.    A focused skin examination was performed of the face and chest. All findings within normal limits unless otherwise noted below.    Assessment/Plan   1. Elective surgery for purposes other than treating health conditions (4)  Left Forehead, Left Temple, Right Forehead, Right Zygomatic Area  Dynamic and static rhytids    Botox is a neurotoxin which prevents muscles from jerzy that can help soften and smooth fine lines/wrinkles.  Risks and benefits were discussed bruising and swelling, lid droop, dropping of eyebrow, asymmetrical smile. For continued improvement ongoing treatment with Botox will soften lines  Botox typically begins to work 3 days after injection, full effect by 14 days. Typically lasts about 3 months. Continued treatments will continue to help lines to soften over time. However, lines may not completely disappear.  After treatment do not lie flat for 4 hours and do not exercise for 24 hours after the procedure.    Chemodenervation - Left Forehead, Left Temple, Right Forehead, Right Zygomatic Area    Date/Time: 2/27/2025 3:42 PM    Performed by: Ekta Taylor DO  Authorized by: Ekta Taylor DO      Cosmetic:  yesnot medical botulinum toxin  injection    Consent:      Consent obtained:  Written     Consent given by:  Patient     Risks discussed:  Poor cosmetic result     Alternatives discussed:  No treatment    Universal protocol:      Relevant documents present and verified:  Yes       Site/side verified:  Yes       Patient identity confirmed:  Verbally with patient    Pre-procedure details:   Prep type:  Isopropyl alcohol    Procedure details:      Diluted by:  Preservative free saline     Toxin (Brand):  OnaBoNT-A (Botox)     Concentration (u/mL):  2U/0.1mL    Total units injected:  23    Post-procedure details:      Patient tolerance of procedure:  Tolerated well, no immediate complications    Comments: Units injected:  Glabella - 9 Units  Periorbital - 14 (7 units each side)  Lot: [id number]  Exp:2/28/27    Related Procedures  Staff Communication: Dermatology Local Anesthesia: Other - Drug: Botox  Strength: 2U/0.1mL Amount: 23 Units    Related Medications  lidocaine-tetracaine-benzocain (Enznonuty) 10-10-20 % ointment  Apply 1 Application topically see administration instructions. Physician to apply in office 1 hour prior to procedure    tretinoin (Retin-A) 0.025 % cream  Apply topically once daily at bedtime. A pea-sized amount to the whole face, start every 2-3 nights and gradually increase to nightly      Laser Procedure:    Fraxel laser is a laser used for treatment of fine to moderate lines from aging, brown spots and some types of scarring.  Typically more than 1 treatment is needed for treatment, depending on the goal of treatment. For brown spots 1 treat may be adequate in some circumstances. For fine lines and wrinkles typically 3-4 treatments are needed.    If there is a known history of cold sores medication can be prescribed to prevent this from occurring as the procedure may cause cold sores to occur.  Topical retinoids should be avoided 1 week before and 1 week after the procedure.    Following treatment the area treated will be red, it  will peel and will swell. The downtime of the procedure is approximately 7-10 days. It may be closer to 14 days for some patients.    Fraxel Laser Procedure Note  Patient questions:   Patient using isotretinoin - no  Patient using retinoids - no  Patient has history of cold sores - n/a  Consents obtained - Yes - Written  Photos obtained -  Anesthetic usage:   Topical anesthetic 10/10/10 Benzocaine/Lidocaine/Tetracaine 10/10/10% applied in office for  60 minutes prior to procedure  Treatment Details  Fraxel Type: Re-store  Cooling: [employer] cooler  Wavelength: 1927nm  Treatment # 2  Location:  Chest  Energy (mJ): 10  Treatment Level: 4  Passes: 8  Comments: erythema noted, 1.00kj

## 2025-03-13 ENCOUNTER — APPOINTMENT (OUTPATIENT)
Dept: DERMATOLOGY | Facility: CLINIC | Age: 50
End: 2025-03-13
Payer: COMMERCIAL

## 2025-03-13 DIAGNOSIS — Z41.9 ELECTIVE SURGERY FOR PURPOSES OTHER THAN TREATING HEALTH CONDITIONS: Primary | ICD-10-CM

## 2025-03-13 PROCEDURE — DFRX1: Performed by: DERMATOLOGY

## 2025-03-13 NOTE — PROGRESS NOTES
Subjective     Katiuska Dyer is a 49 y.o. female who presents for the following: Cosmetic (Laser treatment #2 LV 01/07/25 chest - Patient states chest has improved.  Patient would like to have back treated today for the first time.).     Review of Systems:  No other skin or systemic complaints other than what is documented elsewhere in the note.    The following portions of the chart were reviewed this encounter and updated as appropriate:          Skin Cancer History  No skin cancer on file.      Specialty Problems    None       Objective   Well appearing patient in no apparent distress; mood and affect are within normal limits.    A focused skin examination was performed of the upper back. All findings within normal limits unless otherwise noted below.    Assessment/Plan   1. Elective surgery for purposes other than treating health conditions (2)  Left Upper Back, Right Upper Back  Brown reticular macules and mottled pigmentation on the back, shoulders    Fraxel laser is a laser used for treatment of fine to moderate lines from aging, brown spots and some types of scarring.  Typically more than 1 treatment is needed for treatment, depending on the goal of treatment. For brown spots 1 treat may be adequate in some circumstances. For fine lines and wrinkles typically 3-4 treatments are needed.      Following treatment the face will be red, it will peel and will swell. The downtime of the procedure is approximately 7-10 days. It may be closer to 14 days for some patients.    Fraxel Laser Procedure Note  Patient questions:   Patient using isotretinoin - No  Patient using retinoids - No  Patient has history of cold sores - N/A for location  Consents obtained - Yes - Written  Photos obtained -Yes  Indication: photodamage, lentigenes  Anesthetic usage:   Topical anesthetic 10/10/10 Benzocaine/Lidocaine/Tetracaine 20/10/10% applied in office for  50 minutes prior to procedure  Treatment Details  Fraxel Type:  Re-store  Cooling: tory  Wavelength: 1927nm  Location:  Upper medial back  Eye protection: adhesive eye shield  Energy (mJ): 10  Treatment Level: 2  Passes: 8  Comments: erythema noted; 1.08kJ      Related Medications  lidocaine-tetracaine-benzocain (Enznonuty) 10-10-20 % ointment  Apply 1 Application topically see administration instructions. Physician to apply in office 1 hour prior to procedure    tretinoin (Retin-A) 0.025 % cream  Apply topically once daily at bedtime. A pea-sized amount to the whole face, start every 2-3 nights and gradually increase to nightly

## 2025-05-09 ENCOUNTER — APPOINTMENT (OUTPATIENT)
Dept: DERMATOLOGY | Facility: CLINIC | Age: 50
End: 2025-05-09
Payer: COMMERCIAL

## 2025-05-09 DIAGNOSIS — Z41.9 ELECTIVE SURGERY FOR PURPOSES OTHER THAN TREATING HEALTH CONDITIONS: Primary | ICD-10-CM

## 2025-05-09 PROCEDURE — PBTXA ADMINISTRATION FEE COSMETIC: Performed by: DERMATOLOGY

## 2025-05-09 PROCEDURE — BOTOX BOTOX 1 UNIT: Performed by: DERMATOLOGY

## 2025-05-09 NOTE — PROGRESS NOTES
Subjective     Katiuska Dyer is a 49 y.o. female who presents for the following: Botulinum Toxin Injection (Last injections were on 2/27/25 and received 23 units. Glabella - 9 Units, Periorbital - 14 (7 units each side)./).          Review of Systems:  No other skin or systemic complaints other than what is documented elsewhere in the note.    The following portions of the chart were reviewed this encounter and updated as appropriate:          Skin Cancer History  Biopsy Log Book  No skin cancers from Specimen Tracking.    Additional History      Specialty Problems    None       Objective   Well appearing patient in no apparent distress; mood and affect are within normal limits.    A focused skin examination was performed of the face. All findings within normal limits unless otherwise noted below.    Assessment/Plan   Skin Exam  1. ELECTIVE SURGERY FOR PURPOSES OTHER THAN TREATING HEALTH CONDITIONS  Glabella  Dynamic and static rhytids  Botox is a neurotoxin which prevents muscles from jerzy that can help soften and smooth fine lines/wrinkles.  Risks and benefits were discussed bruising and swelling, lid droop, dropping of eyebrow, asymmetrical smile. For continued improvement ongoing treatment with Botox will soften lines  Botox typically begins to work 3 days after injection, full effect by 14 days. Typically lasts about 3 months. Continued treatments will continue to help lines to soften over time. However, lines may not completely disappear.  After treatment do not lie flat for 4 hours and do not exercise for 24 hours after the procedure.    Chemodenervation - Glabella    Date/Time: 5/9/2025 12:20 PM    Performed by: Ekta Taylor DO  Authorized by: Ekta Taylor DO      Cosmetic:  yesnot medical botulinum toxin injection    Consent:      Consent obtained:  Written     Consent given by:  Patient     Risks discussed:  Poor cosmetic result     Alternatives discussed:  No treatment    Universal protocol:       Relevant documents present and verified:  Yes       Site/side verified:  Yes       Patient identity confirmed:  Verbally with patient    Pre-procedure details:   Prep type:  Isopropyl alcohol    Procedure details:      Diluted by:  Preservative free saline     Toxin (Brand):  OnaBoNT-A (Botox)     Concentration (u/mL):  2U/0.1mL    Total units injected:  21    Post-procedure details:      Patient tolerance of procedure:  Tolerated well, no immediate complications    Comments: Units injected:21  Glabella: 9  Periorbital: 8 (4 each side)  Frontalis/forehead: 4 (2 each lateral side)  Lot: [id number]  Exp: 2/28/27      Staff Communication: Dermatology Meds: Other - Drug: Botox   Strength: 2U-0.1mL Amount:21 - Glabella  Related Medications  lidocaine-tetracaine-benzocain (Enznonuty) 10-10-20 % ointment  Apply 1 Application topically see administration instructions. Physician to apply in office 1 hour prior to procedure  tretinoin (Retin-A) 0.025 % cream  Apply topically once daily at bedtime. A pea-sized amount to the whole face, start every 2-3 nights and gradually increase to nightly    Follow up in 3-4 months

## 2025-05-09 NOTE — Clinical Note
Botox is a neurotoxin which prevents muscles from jerzy that can help soften and smooth fine lines/wrinkles.  Risks and benefits were discussed bruising and swelling, lid droop, dropping of eyebrow, asymmetrical smile. For continued improvement ongoing treatment with Botox will soften lines  Botox typically begins to work 3 days after injection, full effect by 14 days. Typically lasts about 3 months. Continued treatments will continue to help lines to soften over time. However, lines may not completely disappear.  After treatment do not lie flat for 4 hours and do not exercise for 24 hours after the procedure.

## 2025-08-01 ENCOUNTER — PATIENT MESSAGE (OUTPATIENT)
Dept: DERMATOLOGY | Facility: CLINIC | Age: 50
End: 2025-08-01

## 2025-08-01 ENCOUNTER — DOCUMENTATION (OUTPATIENT)
Dept: DERMATOLOGY | Facility: CLINIC | Age: 50
End: 2025-08-01

## 2025-08-01 ENCOUNTER — APPOINTMENT (OUTPATIENT)
Dept: DERMATOLOGY | Facility: CLINIC | Age: 50
End: 2025-08-01
Payer: COMMERCIAL

## 2025-08-01 DIAGNOSIS — Z41.9 ELECTIVE SURGERY FOR PURPOSES OTHER THAN TREATING HEALTH CONDITIONS: Primary | ICD-10-CM

## 2025-08-01 DIAGNOSIS — L74.510 PRIMARY FOCAL HYPERHIDROSIS, AXILLA: ICD-10-CM

## 2025-08-01 PROCEDURE — BOTOX BOTOX 1 UNIT: Performed by: DERMATOLOGY

## 2025-08-01 PROCEDURE — PBTXA ADMINISTRATION FEE COSMETIC: Performed by: DERMATOLOGY

## 2025-08-01 PROCEDURE — DVOBL: Performed by: DERMATOLOGY

## 2025-08-01 NOTE — PROGRESS NOTES
Subjective     Katiuska Dyer is a 49 y.o. female who presents for the following: Cosmetic (3 month - LV 05/09/25 - Patient here today for Botox and Volbella/Botox - 21units ( Glabella 9u, Periorbital - 8u, Frontalis - 4u) Volobella - 0.55cc  Patient states she is satisfied with last procedures).  Patient also reports she has hyperhidrosis of the axilla. Interferes with daily activities - she sweats through dresses at events and it is embarrassing. She has tried OTC clinical strength deodorant without relief. No prescription medications. Severity index is 3 out of 4.          Review of Systems:  No other skin or systemic complaints other than what is documented elsewhere in the note.    The following portions of the chart were reviewed this encounter and updated as appropriate:          Skin Cancer History  Biopsy Log Book  No skin cancers from Specimen Tracking.    Additional History      Specialty Problems    None       Objective   Well appearing patient in no apparent distress; mood and affect are within normal limits.    A focused skin examination was performed of the face, axilla. All findings within normal limits unless otherwise noted below.    Assessment/Plan   Skin Exam  1. ELECTIVE SURGERY FOR PURPOSES OTHER THAN TREATING HEALTH CONDITIONS  Glabella  Dynamic and static rhytids  Botox is a neurotoxin which prevents muscles from jerzy that can help soften and smooth fine lines/wrinkles.  Risks and benefits were discussed bruising and swelling, lid droop, dropping of eyebrow, asymmetrical smile. For continued improvement ongoing treatment with Botox will soften lines  Botox typically begins to work 3 days after injection, full effect by 14 days. Typically lasts about 3 months. Continued treatments will continue to help lines to soften over time. However, lines may not completely disappear.  After treatment do not lie flat for 4 hours and do not exercise for 24 hours after the procedure.    -  Chemodenervation - Glabella    Date/Time: 8/1/2025 8:41 AM    Performed by: Veronique Rasmussen DO  Authorized by: Ekta Taylor DO      Cosmetic:  yesnot medical botulinum toxin injection    Consent:      Consent obtained:  Written     Consent given by:  Patient     Risks discussed:  Poor cosmetic result     Alternatives discussed:  No treatment    Universal protocol:      Relevant documents present and verified:  Yes       Site/side verified:  Yes       Patient identity confirmed:  Verbally with patient    Pre-procedure details:   Prep type:  Isopropyl alcohol    Procedure details:      Diluted by:  Preservative free saline     Toxin (Brand):  OnaBoNT-A (Botox)     Concentration (u/mL):  2U/01mL      Post-procedure details:      Patient tolerance of procedure:  Tolerated well, no immediate complications    Comments: Units injected: 8 units to periocular rhytides  (4 each side)  Lot: [id number]  Exp: 5/31/27      - Staff Communication: Dermatology Medication: Other - Drug: Botox Strength: 2U/0.1mL Amount: 8U - Glabella  Existing Treatments  - lidocaine-tetracaine-benzocain (Enznonuty) 10-10-20 % ointment - Apply 1 Application topically see administration instructions. Physician to apply in office 1 hour prior to procedure  - tretinoin (Retin-A) 0.025 % cream - Apply topically once daily at bedtime. A pea-sized amount to the whole face, start every 2-3 nights and gradually increase to nightly  2. PRIMARY FOCAL HYPERHIDROSIS, AXILLA (2)  Left Axilla, Right Axilla  Excessive sweating with the skin visibly damp.  Hyperhidrosis is a common condition in which a person sweats excessively. The sweating may affect the whole of your body, or it may only affect certain areas. Commonly affected areas include the armpits, palms and soles.   It is believed to be due to an underlying thermoregulatory dysfunction.  Treatments include topical therapy with Drysol (which may be drying and irritating to the skin), topical Qbrexza cloths,  oral anti-cholinergic medication, iontophoresis devices and in recalcitrant cases can consider Botox injection.    Botox in reserve if Drysol not helpful  This Visit  - aluminum chloride (Drysol) 20 % external solution - Apply topically once daily at bedtime. Once excessive sweating has stopped, may decrease to once or twice weekly. Wash treated area in the morning    The cosmetic-dermatologic indications and contraindications for treatment were discussed, and realistic and expected outcomes of this procedure.   Risks and benefits were discussed and questions were answered including anticipated duration of 3 months to 1 year depending on individual factors.  Expect bruising and swelling with filler.  In rare cases filler can occlude a blood vessel resulting in ulceration of the skin and in rare circumstances cause blindness.  Avoid dental work 2 weeks prior and will avoid dental work 2 weeks after filler injection.      Cosmetic Filler Procedure Note  Diagnosis: fine lines around the mouth, scar on nasal supratip  Location: lower cheeks, nasal tip   Filler: Juvederm Volbella XC  Informed consent: Discussed risks (infection, pain, bleeding, bruising, swelling, allergic reaction, lumpiness of skin, waviness of skin, discoloration of skin, undesirable cosmetic result, need for additional treatment, herpes outbreak, scarring, blindness, and death) and benefits of the procedure, as well as the alternatives.  Informed consent was obtained.  Preparation: The area was cleansed with alcohol.  Anesthesia: None  Procedure Details:  Filler was injected into the appropriate areas using a standard technique.  Pressure was applied for hemostasis.  Ice was offered for swelling.  Lot Number: [id number]  Expiration:  4/14/2026  Total Volume Injected:  0.45  Plan:  Patient was instructed to use Tylenol for any pain.  Return to clinic for any nodules or lumpiness that appear in the first 48 hours.  Patient will call for any problems.   Return to clinic for additional dosing as needed.       Veronique Rasmussen DO   Dermatology Resident, PGY-4    I performed the procedure. I saw and evaluated the patient. I personally obtained the key and critical portions of the history and physical exam or was physically present for key and critical portions performed by the resident/fellow. I reviewed the resident/fellow's documentation and discussed the patient with the resident/fellow. I agree with the resident/fellow's medical decision making as documented in the note.    Ekta Taylor, DO

## 2025-08-01 NOTE — Clinical Note
Hyperhidrosis is a common condition in which a person sweats excessively. The sweating may affect the whole of your body, or it may only affect certain areas. Commonly affected areas include the armpits, palms and soles.   It is believed to be due to an underlying thermoregulatory dysfunction.  Treatments include topical therapy with Drysol (which may be drying and irritating to the skin), topical Qbrexza cloths, oral anti-cholinergic medication, iontophoresis devices and in recalcitrant cases can consider Botox injection.    Botox in reserve if Drysol not helpful

## 2025-08-01 NOTE — PROGRESS NOTES
PA completed on [facility] for Drysol 20% solution.     GoodRx price DD-$20.13, Myxer-$15.74    *This request cannot be processed due to the medication is not covered by the plan* copied from [facility]